# Patient Record
Sex: FEMALE | Race: WHITE | NOT HISPANIC OR LATINO | Employment: FULL TIME | URBAN - METROPOLITAN AREA
[De-identification: names, ages, dates, MRNs, and addresses within clinical notes are randomized per-mention and may not be internally consistent; named-entity substitution may affect disease eponyms.]

---

## 2019-05-08 ENCOUNTER — OFFICE VISIT (OUTPATIENT)
Dept: SURGERY | Facility: CLINIC | Age: 39
End: 2019-05-08
Payer: COMMERCIAL

## 2019-05-08 VITALS
WEIGHT: 184 LBS | DIASTOLIC BLOOD PRESSURE: 85 MMHG | HEIGHT: 64 IN | SYSTOLIC BLOOD PRESSURE: 150 MMHG | BODY MASS INDEX: 31.41 KG/M2

## 2019-05-08 DIAGNOSIS — A63.0 ANAL WARTS: Primary | ICD-10-CM

## 2019-05-08 PROCEDURE — 46600 DIAGNOSTIC ANOSCOPY SPX: CPT | Performed by: SURGERY

## 2019-05-08 PROCEDURE — 99244 OFF/OP CNSLTJ NEW/EST MOD 40: CPT | Mod: 25 | Performed by: SURGERY

## 2019-05-08 RX ORDER — ALPRAZOLAM 0.5 MG/1
0.25 TABLET ORAL NIGHTLY PRN
COMMUNITY
End: 2024-07-11 | Stop reason: HOSPADM

## 2019-05-08 RX ORDER — SERTRALINE HYDROCHLORIDE 50 MG/1
5 TABLET, FILM COATED ORAL
Refills: 4 | COMMUNITY
Start: 2019-02-17 | End: 2023-10-11

## 2019-05-08 NOTE — LETTER
May 8, 2019     Nestor Spencer MD  [address]  [facility]  [place] [facility] [place] [address]    Patient: Luiza Kaminski   YOB: 1980   Date of Visit: 2019       Dear Dr. Spencer:    Thank you for referring Luiza Kaminski to me for evaluation. Below are my notes for this consultation.    If you have questions, please do not hesitate to call me. I look forward to following your patient along with you.         Sincerely,        Ildefonso Clancy MD        CC: No Recipients  Ildefonso Clancy MD  2019  4:12 PM  Sign at close encounter      Chief Complaint:   Chief Complaint   Patient presents with   • Consult   .    HPI     Patient is a 38 y.o. female who presents with the followiny ago got HPV from  - had a procedure to remove the little tags - then she was followed afterwards - they must hav ebeen warts    Moved to [facility] a few years ago - has not been checked in a few years -     Pt has no bleed / no pain / does not notice any tags now - .     Medical History:   Past Medical History:   Diagnosis Date   • Anxiety    • Condyloma    • HPV (human papilloma virus) infection    • Hypothyroidism        Surgical History:   Past Surgical History:   Procedure Laterality Date   •  SECTION      two   • ORTHOPEDIC SURGERY     • SKIN TAG REMOVAL      Rectal area       Social History:   Social History     Social History   • Marital status:      Spouse name: N/A   • Number of children: N/A   • Years of education: N/A     Occupational History   • Not on file.     Social History Main Topics   • Smoking status: Never Smoker   • Smokeless tobacco: Never Used   • Alcohol use Yes   • Drug use: No   • Sexual activity: Defer     Other Topics Concern   • Not on file     Social History Narrative   • No narrative on file       Family History:   Family History   Problem Relation Age of Onset   • Hypertension Mother    • Hypertension Father        Allergies:   Penicillins    Current Medications:       Current Outpatient Prescriptions:   •  ALPRAZolam (XANAX) 0.5 mg tablet, Take 0.5 mg by mouth nightly as needed for anxiety., Disp: , Rfl:   •  LEVOTHYROXINE SODIUM (SYNTHROID ORAL), Take by mouth., Disp: , Rfl:   •  sertraline (ZOLOFT) 50 mg tablet, Take 5 mg by mouth once daily., Disp: , Rfl: 4    Review of Systems  All 14 systems reviewed and the findings are not pertinent to the current problem.    Objective     Vital Signs  Vitals:    05/08/19 1538   BP: (!) 150/85     Body mass index is 31.58 kg/m².      Physicial Exam  General Appearance:  Well developed.  Well nourished.  In no acute distress. Patient was not obese.  Head:  Posture of the head was normal.  Neck:  External features of the neck was normal.  Trachea:  Showed no abnormalities.  Trachea midline.  Extraocular Movements:  Normal.  Pupils:  Reactive to light.   Not deformed.  Oral Cavity:  Mixed dentition was not observed.  Tongue was midline.  Peripheral edema:  Not present.  Genitalia:  Normal.    Anorectal Examination:    No pilonidal sinus was observed.   No pilonidal cyst was observed.   Perianal skin was not erythematous.  No perianal wart was observed.  No anal fissure was observed.   Anus did not have a fistula.   Anal sphincter tone was normal.   No hemorrhoids were seen.   No external anal skin tags were observed.   Anus had no mass.  Rectum:  No rectal abscess was observed.   Rectal prolapse was not observed.   No rectal fistula was observed.   Rectal exam was not tender.   No rectal fluctuance was observed.  Rectal exam showed no mass.   Normal chan-anal skin with no lesions or dermatitis    No warts seen at all    Other Exam:  Musculoskeletal System:  No gross defecits or defects of the upper or lower extremities.  No cyanosis of the fingers.  Lumbar / Lumbosacral Spine:  Lumbar/lumbosacral spine exhibited no abnormalities.  Bilateral thighs:   Posterior aspect was not swollen.   Posterior aspect showed no induration.   Posterior  aspect was not erythematous.   Posterior aspect was not warm.   No mass on the posterior aspect.  Functional Exam:   General/bilateral:  Mobility was not limited.  Neurological:   No confusion was observed.   No disorientation was observed.  Speech:  Normal.  Motor:  No tremor was seen.  Balance:  Normal.  Gait And Stance:  Normal.  Psychiatric:  Mood:  Euthymic.  Affect:  Normal.  Skin:  No clubbing of the fingernails.  Normal upper and lower extremity skin exam.      Problem List Items Addressed This Visit     Anal warts - Primary     She had anal warts treated 9 years ago and was followed for a few years.  She no longer feels any warts but wants to have a checkup.  On exam today there are no internal or external warts.  My plan is for her to simply return if she feels anything abnormal in the future.                   Ildefonso Clancy MD 5/8/2019 4:12 PM           Ildefonso Clancy MD  5/8/2019  4:12 PM  Unsigned  Procedures  The patient required an examination of the anal canal.  Verbal consent was obtained for anoscopy.  First a digital rectal examination was performed.  Then a slotted anoscope was inserted into the anal canal and the canal and distal rectum were examined.  The patient tolerated it well.    Findings:  No warts seen

## 2019-05-08 NOTE — PROGRESS NOTES
Chief Complaint:   Chief Complaint   Patient presents with   • Consult   .    HPI     Patient is a 38 y.o. female who presents with the followiny ago got HPV from  - had a procedure to remove the little tags - then she was followed afterwards - they must hav ebseema warts    Moved to [facility] a few years ago - has not been checked in a few years -     Pt has no bleed / no pain / does not notice any tags now - .     Medical History:   Past Medical History:   Diagnosis Date   • Anxiety    • Condyloma    • HPV (human papilloma virus) infection    • Hypothyroidism        Surgical History:   Past Surgical History:   Procedure Laterality Date   •  SECTION      two   • ORTHOPEDIC SURGERY     • SKIN TAG REMOVAL      Rectal area       Social History:   Social History     Social History   • Marital status:      Spouse name: N/A   • Number of children: N/A   • Years of education: N/A     Occupational History   • Not on file.     Social History Main Topics   • Smoking status: Never Smoker   • Smokeless tobacco: Never Used   • Alcohol use Yes   • Drug use: No   • Sexual activity: Defer     Other Topics Concern   • Not on file     Social History Narrative   • No narrative on file       Family History:   Family History   Problem Relation Age of Onset   • Hypertension Mother    • Hypertension Father        Allergies:   Penicillins    Current Medications:      Current Outpatient Prescriptions:   •  ALPRAZolam (XANAX) 0.5 mg tablet, Take 0.5 mg by mouth nightly as needed for anxiety., Disp: , Rfl:   •  LEVOTHYROXINE SODIUM (SYNTHROID ORAL), Take by mouth., Disp: , Rfl:   •  sertraline (ZOLOFT) 50 mg tablet, Take 5 mg by mouth once daily., Disp: , Rfl: 4    Review of Systems  All 14 systems reviewed and the findings are not pertinent to the current problem.    Objective     Vital Signs  Vitals:    19 1538   BP: (!) 150/85     Body mass index is 31.58 kg/m².      Physicial Exam  General  Appearance:  Well developed.  Well nourished.  In no acute distress. Patient was not obese.  Head:  Posture of the head was normal.  Neck:  External features of the neck was normal.  Trachea:  Showed no abnormalities.  Trachea midline.  Extraocular Movements:  Normal.  Pupils:  Reactive to light.   Not deformed.  Oral Cavity:  Mixed dentition was not observed.  Tongue was midline.  Peripheral edema:  Not present.  Genitalia:  Normal.    Anorectal Examination:    No pilonidal sinus was observed.   No pilonidal cyst was observed.   Perianal skin was not erythematous.  No perianal wart was observed.  No anal fissure was observed.   Anus did not have a fistula.   Anal sphincter tone was normal.   No hemorrhoids were seen.   No external anal skin tags were observed.   Anus had no mass.  Rectum:  No rectal abscess was observed.   Rectal prolapse was not observed.   No rectal fistula was observed.   Rectal exam was not tender.   No rectal fluctuance was observed.  Rectal exam showed no mass.   Normal chan-anal skin with no lesions or dermatitis    No warts seen at all    Other Exam:  Musculoskeletal System:  No gross defecits or defects of the upper or lower extremities.  No cyanosis of the fingers.  Lumbar / Lumbosacral Spine:  Lumbar/lumbosacral spine exhibited no abnormalities.  Bilateral thighs:   Posterior aspect was not swollen.   Posterior aspect showed no induration.   Posterior aspect was not erythematous.   Posterior aspect was not warm.   No mass on the posterior aspect.  Functional Exam:   General/bilateral:  Mobility was not limited.  Neurological:   No confusion was observed.   No disorientation was observed.  Speech:  Normal.  Motor:  No tremor was seen.  Balance:  Normal.  Gait And Stance:  Normal.  Psychiatric:  Mood:  Euthymic.  Affect:  Normal.  Skin:  No clubbing of the fingernails.  Normal upper and lower extremity skin exam.      Problem List Items Addressed This Visit     Anal warts - Primary     She  had anal warts treated 9 years ago and was followed for a few years.  She no longer feels any warts but wants to have a checkup.  On exam today there are no internal or external warts.  My plan is for her to simply return if she feels anything abnormal in the future.                   Ildefonso Clancy MD 5/8/2019 4:12 PM

## 2019-05-08 NOTE — ASSESSMENT & PLAN NOTE
She had anal warts treated 9 years ago and was followed for a few years.  She no longer feels any warts but wants to have a checkup.  On exam today there are no internal or external warts.  My plan is for her to simply return if she feels anything abnormal in the future.

## 2019-05-08 NOTE — PATIENT INSTRUCTIONS
"ANAL WARTS    What are anal warts?  Anal warts (also called \"condyloma acuminata\") are a condition that affects the area around and inside the anus. They may also affect the skin of the genital area. They first appear as tiny spots or growths, perhaps as small as the head of a pin, and may grow larger than the size of a pea. Usually, they do not cause pain or discomfort to afflicted individuals. As a result, patients may be unaware that the warts are present. Some patients will experience symptoms such as itching, bleeding, mucus discharge and/or a feeling of a lump or mass in the anal area.    What causes these warts?  They are thought to be caused by the human papilloma virus (HPV) which is transmitted from person to person by direct contact. HPV is considered a sexually transmitted disease. You do not have to have anal intercourse to develop anal condyloma.    Do these warts always need to be removed?  Yes. If they are not removed, the warts usually grow larger and multiply. If left untreated, the warts may lead to an increased risk of cancer in the affected area.    What treatments are available?  If warts are very small and are located only on the skin around the anus, they may be treated with a topical medication. They may also be treated by a physician by freezing the warts with liquid nitrogen. Warts may also be removed surgically. Surgery provides immediate results but must be performed using either a local anesthetic - such as novocaine - or a general or spinal anesthetic, depending on the number and exact location of warts being treated. Warts inside the anal canal usually are not suitable for treatment by medications, and in most cases need to be treated surgically.    Must I be hospitalized for surgical treatment?  No. Surgical treatment of anal warts is usually performed as outpatient surgery.    How much time will I lose from work after surgical treatment?  Most people are moderately uncomfortable for " a few days after treatment, and pain medication may be prescribed. Depending on the extent of the disease, some people return to work the next day, while others may remain out of work for several days.    Will a single treatment cure the problem?  Recurrent warts are common. The virus that causes the warts can live concealed in tissues that appear normal for several months before another wart develops. As new warts develop they usually can be treated in the physician's office. Sometimes new warts develop so rapidly that office treatment would be quite uncomfortable. In these situations, a second and usually third outpatient surgical visit may be recommended.    How long is treatment usually continued?  Follow-up visits are necessary at frequent intervals for several months after the last wart is observed to be certain that no new warts occur.    What can be done to avoid getting these warts again?  In some cases, warts may recur repeatedly after successful removal, since the virus that causes the warts often persists in a dormant state in body tissues. Discuss with your physician how often you should be evaluated for recurrent warts. Abstain from sexual contact with individuals who have anal (or genital) warts. Since many individuals may be unaware that they suffer from this condition, sexual abstinence, condom protection or limiting sexual contact to a single partner will reduce your potential exposure to the contagious virus that causes these warts. As a precaution, sexual partners ought to be checked, even if they have no symptoms.

## 2020-10-23 ENCOUNTER — TRANSCRIBE ORDERS (OUTPATIENT)
Dept: SCHEDULING | Age: 40
End: 2020-10-23

## 2020-10-23 DIAGNOSIS — M16.2 BILATERAL OSTEOARTHRITIS RESULTING FROM HIP DYSPLASIA: ICD-10-CM

## 2020-10-23 DIAGNOSIS — Z96.641 PRESENCE OF RIGHT ARTIFICIAL HIP JOINT: Primary | ICD-10-CM

## 2020-10-30 ENCOUNTER — HOSPITAL ENCOUNTER (OUTPATIENT)
Dept: RADIOLOGY | Age: 40
Discharge: HOME | End: 2020-10-30
Attending: SPECIALIST
Payer: COMMERCIAL

## 2020-10-30 DIAGNOSIS — M16.2 BILATERAL OSTEOARTHRITIS RESULTING FROM HIP DYSPLASIA: ICD-10-CM

## 2020-10-30 DIAGNOSIS — Z96.641 PRESENCE OF RIGHT ARTIFICIAL HIP JOINT: ICD-10-CM

## 2020-10-30 PROCEDURE — 73502 X-RAY EXAM HIP UNI 2-3 VIEWS: CPT | Mod: RT

## 2021-04-14 DIAGNOSIS — Z23 ENCOUNTER FOR IMMUNIZATION: ICD-10-CM

## 2022-06-08 ENCOUNTER — OFFICE VISIT (OUTPATIENT)
Dept: SURGERY | Facility: CLINIC | Age: 42
End: 2022-06-08
Payer: COMMERCIAL

## 2022-06-08 VITALS — WEIGHT: 164 LBS | HEIGHT: 64 IN | BODY MASS INDEX: 28 KG/M2

## 2022-06-08 DIAGNOSIS — A63.0 ANAL WARTS: ICD-10-CM

## 2022-06-08 DIAGNOSIS — L30.9 DERMATITIS OF PERIANAL REGION: Primary | ICD-10-CM

## 2022-06-08 PROCEDURE — 3008F BODY MASS INDEX DOCD: CPT | Performed by: SURGERY

## 2022-06-08 PROCEDURE — 99204 OFFICE O/P NEW MOD 45 MIN: CPT | Performed by: SURGERY

## 2022-06-08 RX ORDER — LIFITEGRAST 50 MG/ML
SOLUTION/ DROPS OPHTHALMIC
COMMUNITY
Start: 2022-05-08 | End: 2024-07-11 | Stop reason: HOSPADM

## 2022-06-08 NOTE — PROGRESS NOTES
Chief Complaint:   Chief Complaint   Patient presents with   • Anal Itching       HPI     Patient is a 41 y.o. female who presents with the following:  Pt has had itching back there - started a few months ago - now won't go away - used to be on and off - now persistent - worse in last three weeks - not going away    Pt doing peloton - stopped last week due to bottom -     I saw her three years ago -  She had had a hx of anal warts - three years ago all was fine    ANORECTAL SYMPTOM GRID    1 - Timing / Duration - 2-3 mo - nevrer before  2 - Bleeding - no  3 - Pain - no - a little irritated to wipe -   4 - External tissue - one thing back there -   5 - BM Frequency - daily  6 - Dermatitis - yes - not bad in day - maybe after Bm - bad at night - keeps her up a little - delays sleep - water can sting - scratch in shower  7 - Chemicals - last three weeks - prep H cream - then used tucks wipes -   8 - Moderating Factors - no  9 - Last Colonoscopy - no          Medical History:   Past Medical History:   Diagnosis Date   • Anxiety    • Condyloma    • HPV (human papilloma virus) infection    • Hypothyroidism        Surgical History:   Past Surgical History:   Procedure Laterality Date   •  SECTION      two   • ORTHOPEDIC SURGERY     • SKIN TAG REMOVAL      Rectal area       Social History:   Social History     Socioeconomic History   • Marital status:      Spouse name: Not on file   • Number of children: Not on file   • Years of education: Not on file   • Highest education level: Not on file   Occupational History   • Not on file   Tobacco Use   • Smoking status: Never Smoker   • Smokeless tobacco: Never Used   Vaping Use   • Vaping Use: Never used   Substance and Sexual Activity   • Alcohol use: Yes   • Drug use: No   • Sexual activity: Defer   Other Topics Concern   • Not on file   Social History Narrative   • Not on file     Social Determinants of Health     Financial Resource Strain: Not on file    Food Insecurity: Not on file   Transportation Needs: Not on file   Physical Activity: Not on file   Stress: Not on file   Social Connections: Not on file   Intimate Partner Violence: Not on file   Housing Stability: Not on file       Family History:   Family History   Problem Relation Age of Onset   • Hypertension Biological Mother    • Hypertension Biological Father        Allergies:   Penicillins    Current Medications:      Current Outpatient Medications:   •  ALPRAZolam (XANAX) 0.5 mg tablet, Take 0.5 mg by mouth nightly as needed for anxiety., Disp: , Rfl:   •  LEVOTHYROXINE SODIUM (SYNTHROID ORAL), Take by mouth., Disp: , Rfl:   •  XIIDRA 5 % dropperette dropperette, INSTILL 1 DROP INTO BOTH EYES TWICE A DAY, Disp: , Rfl:   •  sertraline (ZOLOFT) 50 mg tablet, Take 5 mg by mouth once daily., Disp: , Rfl: 4    Review of Systems  All 14 systems reviewed and the findings are not pertinent to the current problem.    Objective     Vital Signs  There were no vitals filed for this visit.  Body mass index is 28.15 kg/m².      Physicial Exam  General Appearance:  Well developed.  Well nourished.  In no acute distress.    Anorectal Examination:    No pilonidal sinus was observed.   No pilonidal cyst was observed.   Perianal skin was not erythematous.  No perianal wart was observed.  No anal fissure was observed.   Anus did not have a fistula.   Anal sphincter tone was normal.   No hemorrhoids were seen.   No external anal skin tags were observed.   Anus had no mass.  Rectum:  No rectal abscess was observed.   Rectal prolapse was not observed.   No rectal fistula was observed.   Rectal exam was not tender.   No rectal fluctuance was observed.  Rectal exam showed no mass.       Skin:  Dermatitis was seen on the chan-anal skin.  Specifics:  Subtle raw skin around bottom.        Problem List Items Addressed This Visit     Anal warts     There are no warts seen on todays exam.           Dermatitis of perianal region -  Primary     The patient has irritated skin around the anus and this will be treated with avoidance of hemorrhoid products and topical Calmoseptine to allow the skin to heal.   I will see them in one month.                       Ildefonso Clancy MD 6/8/2022 7:12 PM

## 2022-06-08 NOTE — PATIENT INSTRUCTIONS
Chan-anal Dermatitis    Types  There seem to be three main types of chan-anal dermatitis that I see in my practice:  1) Contact dermatitis caused by over-use of wet wipes, preparation H and Tux wipes, and many other topical agents.  2)  Dermatitis caused by a fungal skin infection.  3)  Dermatitis of unknown origin or “pruritus ani.”  All three of these types can be improved with very simple measures.    Symptoms  Chan-anal dermatitis causes tingling, stinging, and itching of the chan-anal skin.  Some people describe a “raw” sensation and the feeling of burning when the shower or bath water touches that area.  People with chan-anal dermatitis have sensitivity and pain when cleaning up after a bowel movement but NOT specifically during the act of defecation.    Physical Exam  There is usually thickening of the chan-anal skin with tiny nicks and cuts that are only visible upon very close inspection.  This is why most physicians miss the diagnosis.  Contact dermatitis makes the skin look raw and denuded.  Fungal dermatitis looks bright red with a sharp edge around it  it from normal skin.  Pruritis ani can look like thickened, white skin with linear cracks in it.    Treatment  All of these disorders can be treated with the same two simple steps:  1) stop using wet wipes and topical hemorrhoid creams.  2)  use calmoseptine or some other inert cream that protects and soothes the skin.  Fungal infections usually resolve quickly with the application of the correct topical anti-fungal cream.  Contact dermatitis usually resolves just with avoidance of wet wipes.  Pruritis ani (in the absence of any other skin disorder like fungal infection or contact dermatitis) can be treated with steroids right off the bat.    Pitfalls  As you can see, NONE of these disorders will respond at all to Preparation H and similar hemorrhoid creams.  These agents can make the irritation worse.  Steroids are also commonly prescribed when  patients complain of chan-anal itching, and this is a mistake.  Steroids will actually make contact dermatitis and fungal infections MUCH WORSE.

## 2022-06-08 NOTE — LETTER
2022     RAMÓN Wesley C  [address] [place] [address]    Patient: Luiza Kaminski  YOB: 1980  Date of Visit: 2022      Dear Dr. Casas:    Thank you for referring Luiza Kaminski to me for evaluation. Below are my notes for this consultation.    If you have questions, please do not hesitate to call me. I look forward to following your patient along with you.         Sincerely,        Ildefonso Clancy MD        CC: No Recipients  Ildefonso Clancy MD  2022  7:12 PM  Sign when Signing Visit      Chief Complaint:   Chief Complaint   Patient presents with   • Anal Itching       HPI     Patient is a 41 y.o. female who presents with the following:  Pt has had itching back there - started a few months ago - now won't go away - used to be on and off - now persistent - worse in last three weeks - not going away    Pt doing peloton - stopped last week due to bottom -     I saw her three years ago -  She had had a hx of anal warts - three years ago all was fine    ANORECTAL SYMPTOM GRID    1 - Timing / Duration - 2-3 mo - nevrer before  2 - Bleeding - no  3 - Pain - no - a little irritated to wipe -   4 - External tissue - one thing back there -   5 - BM Frequency - daily  6 - Dermatitis - yes - not bad in day - maybe after Bm - bad at night - keeps her up a little - delays sleep - water can sting - scratch in shower  7 - Chemicals - last three weeks - prep H cream - then used tucks wipes -   8 - Moderating Factors - no  9 - Last Colonoscopy - no          Medical History:   Past Medical History:   Diagnosis Date   • Anxiety    • Condyloma    • HPV (human papilloma virus) infection    • Hypothyroidism        Surgical History:   Past Surgical History:   Procedure Laterality Date   •  SECTION      two   • ORTHOPEDIC SURGERY     • SKIN TAG REMOVAL      Rectal area       Social History:   Social History     Socioeconomic History   • Marital status:      Spouse name: Not on  file   • Number of children: Not on file   • Years of education: Not on file   • Highest education level: Not on file   Occupational History   • Not on file   Tobacco Use   • Smoking status: Never Smoker   • Smokeless tobacco: Never Used   Vaping Use   • Vaping Use: Never used   Substance and Sexual Activity   • Alcohol use: Yes   • Drug use: No   • Sexual activity: Defer   Other Topics Concern   • Not on file   Social History Narrative   • Not on file     Social Determinants of Health     Financial Resource Strain: Not on file   Food Insecurity: Not on file   Transportation Needs: Not on file   Physical Activity: Not on file   Stress: Not on file   Social Connections: Not on file   Intimate Partner Violence: Not on file   Housing Stability: Not on file       Family History:   Family History   Problem Relation Age of Onset   • Hypertension Biological Mother    • Hypertension Biological Father        Allergies:   Penicillins    Current Medications:      Current Outpatient Medications:   •  ALPRAZolam (XANAX) 0.5 mg tablet, Take 0.5 mg by mouth nightly as needed for anxiety., Disp: , Rfl:   •  LEVOTHYROXINE SODIUM (SYNTHROID ORAL), Take by mouth., Disp: , Rfl:   •  XIIDRA 5 % dropperette dropperette, INSTILL 1 DROP INTO BOTH EYES TWICE A DAY, Disp: , Rfl:   •  sertraline (ZOLOFT) 50 mg tablet, Take 5 mg by mouth once daily., Disp: , Rfl: 4    Review of Systems  All 14 systems reviewed and the findings are not pertinent to the current problem.    Objective     Vital Signs  There were no vitals filed for this visit.  Body mass index is 28.15 kg/m².      Physicial Exam  General Appearance:  Well developed.  Well nourished.  In no acute distress.    Anorectal Examination:    No pilonidal sinus was observed.   No pilonidal cyst was observed.   Perianal skin was not erythematous.  No perianal wart was observed.  No anal fissure was observed.   Anus did not have a fistula.   Anal sphincter tone was normal.   No hemorrhoids  were seen.   No external anal skin tags were observed.   Anus had no mass.  Rectum:  No rectal abscess was observed.   Rectal prolapse was not observed.   No rectal fistula was observed.   Rectal exam was not tender.   No rectal fluctuance was observed.  Rectal exam showed no mass.       Skin:  Dermatitis was seen on the chan-anal skin.  Specifics:  Subtle raw skin around bottom.        Problem List Items Addressed This Visit     Anal warts     There are no warts seen on todays exam.           Dermatitis of perianal region - Primary     The patient has irritated skin around the anus and this will be treated with avoidance of hemorrhoid products and topical Calmoseptine to allow the skin to heal.   I will see them in one month.                       Ildefonso Clancy MD 6/8/2022 7:12 PM

## 2022-07-10 NOTE — PROGRESS NOTES
Chief Complaint:   Chief Complaint   Patient presents with   • Follow-up       HPI     Patient is a 41 y.o. female who presents with the following:      Anal Warts:  19 - normal exam    Dermatitis of perianal region:  22 - sx - itching 3 mo - PE mild raw skin around botom - plan stop wipes and use neel    A little better but still itchy and irritated ayanna at night  (+) itching is the same; worsens at night  (-) no stinging or burning  (-) stopped the creams/wipes  (-) no vaginal itching    Using the neel 2x per day    Medical History:   Past Medical History:   Diagnosis Date   • Anxiety    • Condyloma    • HPV (human papilloma virus) infection    • Hypothyroidism        Surgical History:   Past Surgical History:   Procedure Laterality Date   •  SECTION      two   • ORTHOPEDIC SURGERY     • SKIN TAG REMOVAL      Rectal area       Social History:   Social History     Socioeconomic History   • Marital status:      Spouse name: Not on file   • Number of children: Not on file   • Years of education: Not on file   • Highest education level: Not on file   Occupational History   • Not on file   Tobacco Use   • Smoking status: Never Smoker   • Smokeless tobacco: Never Used   Vaping Use   • Vaping Use: Never used   Substance and Sexual Activity   • Alcohol use: Yes   • Drug use: No   • Sexual activity: Defer   Other Topics Concern   • Not on file   Social History Narrative   • Not on file     Social Determinants of Health     Financial Resource Strain: Not on file   Food Insecurity: Not on file   Transportation Needs: Not on file   Physical Activity: Not on file   Stress: Not on file   Social Connections: Not on file   Intimate Partner Violence: Not on file   Housing Stability: Not on file       Family History:   Family History   Problem Relation Age of Onset   • Hypertension Biological Mother    • Hypertension Biological Father        Allergies:   Penicillins    Current Medications:       Current Outpatient Medications:   •  ALPRAZolam (XANAX) 0.5 mg tablet, Take 0.5 mg by mouth nightly as needed for anxiety., Disp: , Rfl:   •  betamethasone, augmented, (DIPROLENE, AUGMENTED,) 0.05 % ointment, Apply topically 2 (two) times a day., Disp: 30 g, Rfl: 0  •  LEVOTHYROXINE SODIUM (SYNTHROID ORAL), Take by mouth., Disp: , Rfl:   •  sertraline (ZOLOFT) 50 mg tablet, Take 5 mg by mouth once daily., Disp: , Rfl: 4  •  XIIDRA 5 % dropperette dropperette, INSTILL 1 DROP INTO BOTH EYES TWICE A DAY, Disp: , Rfl:     Review of Systems  All 14 systems reviewed and the findings are not pertinent to the current problem.    Objective     Vital Signs  There were no vitals filed for this visit.  Body mass index is 27.98 kg/m².      Physicial Exam  General Appearance:  Well developed.  Well nourished.  In no acute distress.    Anorectal Examination:    No pilonidal sinus was observed.   No pilonidal cyst was observed.   Perianal skin was not erythematous.  No perianal wart was observed.  No anal fissure was observed.   Anus did not have a fistula.   Anal sphincter tone was normal.   No hemorrhoids were seen.   No external anal skin tags were observed.   Anus had no mass.  Rectum:  No rectal abscess was observed.   Rectal prolapse was not observed.   No rectal fistula was observed.   Rectal exam was not tender.   No rectal fluctuance was observed.  Rectal exam showed no mass.       Skin:  Dermatitis was seen on the chan-anal skin.  Specifics:  Chan-anal whitish, shiny epithelium.    Problem List Items Addressed This Visit        Infectious/Inflammatory    Dermatitis of perianal region - Primary     Symptoms have not resolved after 1 month of Calmoseptine. Her physical exam and continued symptoms are consistent with a primary dermatitis. This will be treated a topical steroid, Diprolene. I will see the patient back in one month.           Relevant Medications    betamethasone, augmented, (DIPROLENE, AUGMENTED,) 0.05 %  RAMÓN Howell 7/20/2022 11:06 AM

## 2022-07-20 ENCOUNTER — OFFICE VISIT (OUTPATIENT)
Dept: SURGERY | Facility: CLINIC | Age: 42
End: 2022-07-20
Payer: COMMERCIAL

## 2022-07-20 VITALS — BODY MASS INDEX: 27.83 KG/M2 | WEIGHT: 163 LBS | HEIGHT: 64 IN

## 2022-07-20 DIAGNOSIS — L30.9 DERMATITIS OF PERIANAL REGION: Primary | ICD-10-CM

## 2022-07-20 PROCEDURE — 3008F BODY MASS INDEX DOCD: CPT | Performed by: SURGERY

## 2022-07-20 PROCEDURE — 99213 OFFICE O/P EST LOW 20 MIN: CPT | Performed by: SURGERY

## 2022-07-20 RX ORDER — BETAMETHASONE DIPROPIONATE 0.5 MG/G
OINTMENT, AUGMENTED TOPICAL 2 TIMES DAILY
Qty: 30 G | Refills: 0 | Status: SHIPPED | OUTPATIENT
Start: 2022-07-20 | End: 2024-07-11 | Stop reason: HOSPADM

## 2022-07-20 NOTE — LETTER
2022     RAMÓN Wesley C  [address] [facility]  [place] [address]    Patient: Luiza Kaminski  YOB: 1980  Date of Visit: 2022      Dear Dr. Casas:    Thank you for referring Luiza Kaminski to me for evaluation. Below are my notes for this consultation.    If you have questions, please do not hesitate to call me. I look forward to following your patient along with you.         Sincerely,        Ildefonso Clancy MD        CC: No Recipients  Angelita Aldana PA C  2022 11:06 AM  Sign when Signing Visit      Chief Complaint:   Chief Complaint   Patient presents with   • Follow-up       HPI     Patient is a 41 y.o. female who presents with the following:      Anal Warts:  19 - normal exam    Dermatitis of perianal region:  22 - sx - itching 3 mo - PE mild raw skin around botom - plan stop wipes and use neel    A little better but still itchy and irritated ayanna at night  (+) itching is the same; worsens at night  (-) no stinging or burning  (-) stopped the creams/wipes  (-) no vaginal itching    Using the neel 2x per day    Medical History:   Past Medical History:   Diagnosis Date   • Anxiety    • Condyloma    • HPV (human papilloma virus) infection    • Hypothyroidism        Surgical History:   Past Surgical History:   Procedure Laterality Date   •  SECTION      two   • ORTHOPEDIC SURGERY     • SKIN TAG REMOVAL      Rectal area       Social History:   Social History     Socioeconomic History   • Marital status:      Spouse name: Not on file   • Number of children: Not on file   • Years of education: Not on file   • Highest education level: Not on file   Occupational History   • Not on file   Tobacco Use   • Smoking status: Never Smoker   • Smokeless tobacco: Never Used   Vaping Use   • Vaping Use: Never used   Substance and Sexual Activity   • Alcohol use: Yes   • Drug use: No   • Sexual activity: Defer   Other Topics Concern   • Not on file   Social  History Narrative   • Not on file     Social Determinants of Health     Financial Resource Strain: Not on file   Food Insecurity: Not on file   Transportation Needs: Not on file   Physical Activity: Not on file   Stress: Not on file   Social Connections: Not on file   Intimate Partner Violence: Not on file   Housing Stability: Not on file       Family History:   Family History   Problem Relation Age of Onset   • Hypertension Biological Mother    • Hypertension Biological Father        Allergies:   Penicillins    Current Medications:      Current Outpatient Medications:   •  ALPRAZolam (XANAX) 0.5 mg tablet, Take 0.5 mg by mouth nightly as needed for anxiety., Disp: , Rfl:   •  betamethasone, augmented, (DIPROLENE, AUGMENTED,) 0.05 % ointment, Apply topically 2 (two) times a day., Disp: 30 g, Rfl: 0  •  LEVOTHYROXINE SODIUM (SYNTHROID ORAL), Take by mouth., Disp: , Rfl:   •  sertraline (ZOLOFT) 50 mg tablet, Take 5 mg by mouth once daily., Disp: , Rfl: 4  •  XIIDRA 5 % dropperette dropperette, INSTILL 1 DROP INTO BOTH EYES TWICE A DAY, Disp: , Rfl:     Review of Systems  All 14 systems reviewed and the findings are not pertinent to the current problem.    Objective     Vital Signs  There were no vitals filed for this visit.  Body mass index is 27.98 kg/m².      Physicial Exam  General Appearance:  Well developed.  Well nourished.  In no acute distress.    Anorectal Examination:    No pilonidal sinus was observed.   No pilonidal cyst was observed.   Perianal skin was not erythematous.  No perianal wart was observed.  No anal fissure was observed.   Anus did not have a fistula.   Anal sphincter tone was normal.   No hemorrhoids were seen.   No external anal skin tags were observed.   Anus had no mass.  Rectum:  No rectal abscess was observed.   Rectal prolapse was not observed.   No rectal fistula was observed.   Rectal exam was not tender.   No rectal fluctuance was observed.  Rectal exam showed no mass.       Skin:   Dermatitis was seen on the chan-anal skin.  Specifics:  Chan-anal whitish, shiny epithelium.    Problem List Items Addressed This Visit        Infectious/Inflammatory    Dermatitis of perianal region - Primary     Symptoms have not resolved after 1 month of Calmoseptine. Her physical exam and continued symptoms are consistent with a primary dermatitis. This will be treated a topical steroid, Diprolene. I will see the patient back in one month.           Relevant Medications    betamethasone, augmented, (DIPROLENE, AUGMENTED,) 0.05 % ointment              RAMÓN Ashley 7/20/2022 11:06 AM

## 2022-07-20 NOTE — ASSESSMENT & PLAN NOTE
Symptoms have not resolved after 1 month of Calmoseptine. Her physical exam and continued symptoms are consistent with a primary dermatitis. This will be treated a topical steroid, Diprolene. I will see the patient back in one month.

## 2022-07-21 ENCOUNTER — TELEPHONE (OUTPATIENT)
Dept: SURGERY | Facility: CLINIC | Age: 42
End: 2022-07-21
Payer: COMMERCIAL

## 2022-07-21 NOTE — TELEPHONE ENCOUNTER
Pt went to pharmacy and her cream wasn't called in ,also she wants to know the name of the cream she will be using

## 2022-09-18 NOTE — PROGRESS NOTES
Chief Complaint:   Chief Complaint   Patient presents with    Follow-up       HPI     Patient is a 42 y.o. female who presents with the following:      Anal warts:  19 - normal exam    Dermatitis of perianal region:  22 - sx - itching 3 mo - PE mild raw skin around botom - plat stop wipes and use neel  22 - sx not improved, still itching - PE mild raw skin - failed neel - plan steroids    All the itching has stopped -     Had postiive urine culture by PCP - RX with ABX - cipro -      Used cream for 3 days - stopped cream due to zero itching        Medical History:   Past Medical History:   Diagnosis Date    Anxiety     Condyloma     HPV (human papilloma virus) infection     Hypothyroidism        Surgical History:   Past Surgical History:   Procedure Laterality Date     SECTION      two    ORTHOPEDIC SURGERY      SKIN TAG REMOVAL      Rectal area       Social History:   Social History     Socioeconomic History    Marital status:      Spouse name: Not on file    Number of children: Not on file    Years of education: Not on file    Highest education level: Not on file   Occupational History    Not on file   Tobacco Use    Smoking status: Never Smoker    Smokeless tobacco: Never Used   Vaping Use    Vaping Use: Never used   Substance and Sexual Activity    Alcohol use: Yes    Drug use: No    Sexual activity: Defer   Other Topics Concern    Not on file   Social History Narrative    Not on file     Social Determinants of Health     Financial Resource Strain: Not on file   Food Insecurity: Not on file   Transportation Needs: Not on file   Physical Activity: Not on file   Stress: Not on file   Social Connections: Not on file   Intimate Partner Violence: Not on file   Housing Stability: Not on file       Family History:   Family History   Problem Relation Age of Onset    Hypertension Biological Mother     Hypertension Biological Father        Allergies:    Penicillins    Current Medications:      Current Outpatient Medications:     ALPRAZolam (XANAX) 0.5 mg tablet, Take 0.25 mg by mouth nightly as needed for anxiety., Disp: , Rfl:     betamethasone, augmented, (DIPROLENE, AUGMENTED,) 0.05 % ointment, Apply topically 2 (two) times a day., Disp: 30 g, Rfl: 0    LEVOTHYROXINE SODIUM (SYNTHROID ORAL), Take 75 mcg by mouth., Disp: , Rfl:     sertraline (ZOLOFT) 50 mg tablet, Take 5 mg by mouth once daily., Disp: , Rfl: 4    XIIDRA 5 % dropperette dropperette, INSTILL 1 DROP INTO BOTH EYES TWICE A DAY, Disp: , Rfl:     Review of Systems  All 14 systems reviewed and the findings are not pertinent to the current problem.    Objective     Vital Signs  There were no vitals filed for this visit.  Body mass index is 27.98 kg/m².      Physicial Exam  General Appearance:  Well developed.  Well nourished.  In no acute distress.    Anorectal Examination:    No pilonidal sinus was observed.   No pilonidal cyst was observed.   Perianal skin was not erythematous.  No perianal wart was observed.  No anal fissure was observed.   Anus did not have a fistula.   Anal sphincter tone was normal.   No hemorrhoids were seen.   No external anal skin tags were observed.   Anus had no mass.  Rectum:  No rectal abscess was observed.   Rectal prolapse was not observed.   No rectal fistula was observed.   Rectal exam was not tender.   No rectal fluctuance was observed.  Rectal exam showed no mass.   Normal chan-anal skin with no lesions or dermatitis    No dermatitis          Problem List Items Addressed This Visit     Dermatitis of perianal region - Primary     Her anal itching has stopped after three days of steroids along with a few days of cipro.  On exam her skin appears normal and she is very happy with her progress.  She will follow up as needed.                      Ildefonso Clancy MD 9/22/2022 4:56 PM

## 2022-09-22 ENCOUNTER — OFFICE VISIT (OUTPATIENT)
Dept: SURGERY | Facility: CLINIC | Age: 42
End: 2022-09-22
Payer: COMMERCIAL

## 2022-09-22 VITALS — WEIGHT: 163 LBS | HEIGHT: 64 IN | BODY MASS INDEX: 27.83 KG/M2

## 2022-09-22 DIAGNOSIS — L30.9 DERMATITIS OF PERIANAL REGION: Primary | ICD-10-CM

## 2022-09-22 PROCEDURE — 99213 OFFICE O/P EST LOW 20 MIN: CPT | Performed by: SURGERY

## 2022-09-22 PROCEDURE — 3008F BODY MASS INDEX DOCD: CPT | Performed by: SURGERY

## 2022-09-22 NOTE — LETTER
2022     RAMÓN Wesley C  [address] [place] [address]    Patient: Luiza Kaminski  YOB: 1980  Date of Visit: 2022      Dear Dr. Casas:    Thank you for referring Luiza Kaminski to me for evaluation. Below are my notes for this consultation.    If you have questions, please do not hesitate to call me. I look forward to following your patient along with you.         Sincerely,        Ildefonso Clancy MD        CC: No Recipients  Ildefonso Clancy MD  2022  4:57 PM  Sign when Signing Visit      Chief Complaint:   Chief Complaint   Patient presents with    Follow-up       HPI     Patient is a 42 y.o. female who presents with the following:      Anal warts:  19 - normal exam    Dermatitis of perianal region:  22 - sx - itching 3 mo - PE mild raw skin around botom - plat stop wipes and use neel  22 - sx not improved, still itching - PE mild raw skin - failed neel - plan steroids    All the itching has stopped -     Had postiive urine culture by PCP - RX with ABX - cipro -      Used cream for 3 days - stopped cream due to zero itching        Medical History:   Past Medical History:   Diagnosis Date    Anxiety     Condyloma     HPV (human papilloma virus) infection     Hypothyroidism        Surgical History:   Past Surgical History:   Procedure Laterality Date     SECTION      two    ORTHOPEDIC SURGERY      SKIN TAG REMOVAL      Rectal area       Social History:   Social History     Socioeconomic History    Marital status:      Spouse name: Not on file    Number of children: Not on file    Years of education: Not on file    Highest education level: Not on file   Occupational History    Not on file   Tobacco Use    Smoking status: Never Smoker    Smokeless tobacco: Never Used   Vaping Use    Vaping Use: Never used   Substance and Sexual Activity    Alcohol use: Yes    Drug use: No    Sexual activity: Defer   Other  Topics Concern    Not on file   Social History Narrative    Not on file     Social Determinants of Health     Financial Resource Strain: Not on file   Food Insecurity: Not on file   Transportation Needs: Not on file   Physical Activity: Not on file   Stress: Not on file   Social Connections: Not on file   Intimate Partner Violence: Not on file   Housing Stability: Not on file       Family History:   Family History   Problem Relation Age of Onset    Hypertension Biological Mother     Hypertension Biological Father        Allergies:   Penicillins    Current Medications:      Current Outpatient Medications:     ALPRAZolam (XANAX) 0.5 mg tablet, Take 0.25 mg by mouth nightly as needed for anxiety., Disp: , Rfl:     betamethasone, augmented, (DIPROLENE, AUGMENTED,) 0.05 % ointment, Apply topically 2 (two) times a day., Disp: 30 g, Rfl: 0    LEVOTHYROXINE SODIUM (SYNTHROID ORAL), Take 75 mcg by mouth., Disp: , Rfl:     sertraline (ZOLOFT) 50 mg tablet, Take 5 mg by mouth once daily., Disp: , Rfl: 4    XIIDRA 5 % dropperette dropperette, INSTILL 1 DROP INTO BOTH EYES TWICE A DAY, Disp: , Rfl:     Review of Systems  All 14 systems reviewed and the findings are not pertinent to the current problem.    Objective     Vital Signs  There were no vitals filed for this visit.  Body mass index is 27.98 kg/m².      Physicial Exam  General Appearance:  Well developed.  Well nourished.  In no acute distress.    Anorectal Examination:    No pilonidal sinus was observed.   No pilonidal cyst was observed.   Perianal skin was not erythematous.  No perianal wart was observed.  No anal fissure was observed.   Anus did not have a fistula.   Anal sphincter tone was normal.   No hemorrhoids were seen.   No external anal skin tags were observed.   Anus had no mass.  Rectum:  No rectal abscess was observed.   Rectal prolapse was not observed.   No rectal fistula was observed.   Rectal exam was not tender.   No rectal fluctuance was  observed.  Rectal exam showed no mass.   Normal chan-anal skin with no lesions or dermatitis    No dermatitis          Problem List Items Addressed This Visit     Dermatitis of perianal region - Primary     Her anal itching has stopped after three days of steroids along with a few days of cipro.  On exam her skin appears normal and she is very happy with her progress.  She will follow up as needed.                      Ildefonso Clancy MD 9/22/2022 4:56 PM

## 2022-09-22 NOTE — ASSESSMENT & PLAN NOTE
Her anal itching has stopped after three days of steroids along with a few days of cipro.  On exam her skin appears normal and she is very happy with her progress.  She will follow up as needed.

## 2022-10-04 ENCOUNTER — TRANSCRIBE ORDERS (OUTPATIENT)
Dept: SCHEDULING | Age: 42
End: 2022-10-04

## 2022-10-04 DIAGNOSIS — E78.00 PURE HYPERCHOLESTEROLEMIA, UNSPECIFIED: Primary | ICD-10-CM

## 2022-10-04 DIAGNOSIS — E03.9 HYPOTHYROIDISM, UNSPECIFIED: ICD-10-CM

## 2022-10-18 ENCOUNTER — HOSPITAL ENCOUNTER (OUTPATIENT)
Dept: RADIOLOGY | Facility: HOSPITAL | Age: 42
Discharge: HOME | End: 2022-10-18
Attending: INTERNAL MEDICINE

## 2022-10-18 DIAGNOSIS — E03.9 HYPOTHYROIDISM, UNSPECIFIED: ICD-10-CM

## 2022-10-18 DIAGNOSIS — E78.00 PURE HYPERCHOLESTEROLEMIA, UNSPECIFIED: ICD-10-CM

## 2022-10-18 PROCEDURE — 75571 CT HRT W/O DYE W/CA TEST: CPT

## 2023-08-23 ENCOUNTER — TELEPHONE (OUTPATIENT)
Dept: SURGERY | Facility: CLINIC | Age: 43
End: 2023-08-23
Payer: COMMERCIAL

## 2023-08-23 NOTE — TELEPHONE ENCOUNTER
Yousif Moeller,    This PT needs to schedule a video chat colonoscopy consult with Angelita.      Thank You

## 2023-10-11 ENCOUNTER — TELEPHONE (OUTPATIENT)
Dept: SURGERY | Facility: CLINIC | Age: 43
End: 2023-10-11

## 2023-10-11 ENCOUNTER — TELEMEDICINE (OUTPATIENT)
Dept: SURGERY | Facility: CLINIC | Age: 43
End: 2023-10-11
Payer: COMMERCIAL

## 2023-10-11 DIAGNOSIS — L30.9 DERMATITIS OF PERIANAL REGION: ICD-10-CM

## 2023-10-11 DIAGNOSIS — Z12.11 ENCOUNTER FOR SCREENING COLONOSCOPY: ICD-10-CM

## 2023-10-11 PROCEDURE — 99999 PR OFFICE/OUTPT VISIT,PROCEDURE ONLY: CPT | Mod: 95

## 2023-10-11 RX ORDER — BETAMETHASONE DIPROPIONATE 0.5 MG/G
OINTMENT, AUGMENTED TOPICAL 2 TIMES DAILY
Qty: 30 G | Refills: 0 | Status: SHIPPED | OUTPATIENT
Start: 2023-10-11 | End: 2024-07-11 | Stop reason: HOSPADM

## 2023-10-11 RX ORDER — ALPRAZOLAM 0.25 MG/1
TABLET ORAL
COMMUNITY
Start: 2023-08-08 | End: 2024-07-11 | Stop reason: HOSPADM

## 2023-10-11 RX ORDER — LEVOTHYROXINE SODIUM 75 UG/1
TABLET ORAL
COMMUNITY
Start: 2023-10-08 | End: 2024-07-11 | Stop reason: HOSPADM

## 2023-10-11 NOTE — ASSESSMENT & PLAN NOTE
This patient has intermittent perianal itching. We will treat this with a topical steroid. She will do this for one month and follow up.

## 2023-10-11 NOTE — PROGRESS NOTES
THIS IS A TELEMEDICINE VISIT  New vs. Established -  Established  Patient in general understands insurance and financial ramifications of this telemedicine visit - Yes.  Consent was obtained for telemedicine visit - Yes.  Pt is in [facility] - Yes.  Modality  - Streemhart video  Time on the call - 15 minutes      Chief Complaint: No chief complaint on file.      HPI     Patient is a 43 y.o. female who presents with the following:      First colonoscopy    Tx in past for perianal dermatitis and anal warts    Denies change in bowel habits, Bleeding with bowel movements or rectal bleeding, hemorrhoids, narrow stools, weight loss, decreased appetite, rectal or abdominal pain, anemia.       NO pacemaker  NO personal hx of colon or rectal cancer  NO personal hx of IBD (UC, Crohn's)  NO FHx of Colon or Rectal Cancers    Medical History:   Past Medical History:   Diagnosis Date    Anxiety     Condyloma     HPV (human papilloma virus) infection     Hypothyroidism        Surgical History:   Past Surgical History:   Procedure Laterality Date     SECTION      two    ORTHOPEDIC SURGERY      SKIN TAG REMOVAL      Rectal area       Social History:   Social History     Socioeconomic History    Marital status:      Spouse name: Not on file    Number of children: Not on file    Years of education: Not on file    Highest education level: Not on file   Occupational History    Not on file   Tobacco Use    Smoking status: Never    Smokeless tobacco: Never   Vaping Use    Vaping Use: Never used   Substance and Sexual Activity    Alcohol use: Yes    Drug use: No    Sexual activity: Defer   Other Topics Concern    Not on file   Social History Narrative    Not on file     Social Determinants of Health     Financial Resource Strain: Not on file   Food Insecurity: Not on file   Transportation Needs: Not on file   Physical Activity: Not on file   Stress: Not on file   Social Connections: Not on file    Intimate Partner Violence: Not on file   Housing Stability: Not on file       Family History:   Family History   Problem Relation Age of Onset    Hypertension Biological Mother     Hypertension Biological Father        Allergies:   Penicillins    Current Medications:      Current Outpatient Medications:     betamethasone, augmented, (DIPROLENE, AUGMENTED,) 0.05 % ointment, Apply topically 2 (two) times a day., Disp: 30 g, Rfl: 0    ALPRAZolam (XANAX) 0.25 mg tablet, TAKE 1 TABLET BY MOUTH EVERY DAY AS NEEDED FOR ANXIETY 30, Disp: , Rfl:     ALPRAZolam (XANAX) 0.5 mg tablet, Take 0.25 mg by mouth nightly as needed for anxiety., Disp: , Rfl:     betamethasone, augmented, (DIPROLENE, AUGMENTED,) 0.05 % ointment, Apply topically 2 (two) times a day., Disp: 30 g, Rfl: 0    LEVOTHYROXINE SODIUM (SYNTHROID ORAL), Take 75 mcg by mouth., Disp: , Rfl:     SYNTHROID 75 mcg tablet, , Disp: , Rfl:     XIIDRA 5 % dropperette dropperette, INSTILL 1 DROP INTO BOTH EYES TWICE A DAY, Disp: , Rfl:     Review of Systems  All 14 systems reviewed and the findings are not pertinent to the current problem.    Objective     Vital Signs  There were no vitals filed for this visit.  There is no height or weight on file to calculate BMI.      Physical Exam  none    Problem List Items Addressed This Visit        Infectious/Inflammatory    Dermatitis of perianal region     This patient has intermittent perianal itching. We will treat this with a topical steroid. She will do this for one month and follow up.         Relevant Medications    betamethasone, augmented, (DIPROLENE, AUGMENTED,) 0.05 % ointment       Other    Encounter for screening colonoscopy     This patient is 43 years old and has never had a colonoscopy.  We will schedule a screening study in the near future.                    RAMÓN Ashley 10/11/2023 9:43 AM

## 2023-10-11 NOTE — ASSESSMENT & PLAN NOTE
This patient is 43 years old and has never had a colonoscopy.  We will schedule a screening study in the near future.

## 2024-05-24 PROCEDURE — 200200 PR NO CHARGE: Performed by: SURGERY

## 2024-05-24 NOTE — H&P
THIS IS A TELEMEDICINE VISIT  New vs. Established -  Established  Patient in general understands insurance and financial ramifications of this telemedicine visit - Yes.  Consent was obtained for telemedicine visit - Yes.  Pt is in [facility] - Yes.  Modality  - Cornicehart video  Time on the call - 15 minutes      Chief Complaint: No chief complaint on file.      HPI     Patient is a 43 y.o. female who presents with the following:      First colonoscopy    Tx in past for perianal dermatitis and anal warts    Denies change in bowel habits, Bleeding with bowel movements or rectal bleeding, hemorrhoids, narrow stools, weight loss, decreased appetite, rectal or abdominal pain, anemia.       NO pacemaker  NO personal hx of colon or rectal cancer  NO personal hx of IBD (UC, Crohn's)  NO FHx of Colon or Rectal Cancers    Medical History:   Past Medical History:   Diagnosis Date   • Anxiety    • Condyloma    • HPV (human papilloma virus) infection    • Hypothyroidism        Surgical History:   Past Surgical History:   Procedure Laterality Date   •  SECTION      two   • ORTHOPEDIC SURGERY     • SKIN TAG REMOVAL      Rectal area       Social History:   Social History     Socioeconomic History   • Marital status:      Spouse name: Not on file   • Number of children: Not on file   • Years of education: Not on file   • Highest education level: Not on file   Occupational History   • Not on file   Tobacco Use   • Smoking status: Never   • Smokeless tobacco: Never   Vaping Use   • Vaping Use: Never used   Substance and Sexual Activity   • Alcohol use: Yes   • Drug use: No   • Sexual activity: Defer   Other Topics Concern   • Not on file   Social History Narrative   • Not on file     Social Determinants of Health     Financial Resource Strain: Not on file   Food Insecurity: Not on file   Transportation Needs: Not on file   Physical Activity: Not on file   Stress: Not on file   Social Connections: Not on file    Intimate Partner Violence: Not on file   Housing Stability: Not on file       Family History:   Family History   Problem Relation Age of Onset   • Hypertension Biological Mother    • Hypertension Biological Father        Allergies:   Penicillins    Current Medications:      Current Outpatient Medications:   •  betamethasone, augmented, (DIPROLENE, AUGMENTED,) 0.05 % ointment, Apply topically 2 (two) times a day., Disp: 30 g, Rfl: 0  •  ALPRAZolam (XANAX) 0.25 mg tablet, TAKE 1 TABLET BY MOUTH EVERY DAY AS NEEDED FOR ANXIETY 30, Disp: , Rfl:   •  ALPRAZolam (XANAX) 0.5 mg tablet, Take 0.25 mg by mouth nightly as needed for anxiety., Disp: , Rfl:   •  betamethasone, augmented, (DIPROLENE, AUGMENTED,) 0.05 % ointment, Apply topically 2 (two) times a day., Disp: 30 g, Rfl: 0  •  LEVOTHYROXINE SODIUM (SYNTHROID ORAL), Take 75 mcg by mouth., Disp: , Rfl:   •  SYNTHROID 75 mcg tablet, , Disp: , Rfl:   •  XIIDRA 5 % dropperette dropperette, INSTILL 1 DROP INTO BOTH EYES TWICE A DAY, Disp: , Rfl:     Review of Systems  All 14 systems reviewed and the findings are not pertinent to the current problem.    Objective     Vital Signs  There were no vitals filed for this visit.  There is no height or weight on file to calculate BMI.      Physical Exam  none    Problem List Items Addressed This Visit        Infectious/Inflammatory    Dermatitis of perianal region     This patient has intermittent perianal itching. We will treat this with a topical steroid. She will do this for one month and follow up.         Relevant Medications    betamethasone, augmented, (DIPROLENE, AUGMENTED,) 0.05 % ointment       Other    Encounter for screening colonoscopy     This patient is 43 years old and has never had a colonoscopy.  We will schedule a screening study in the near future.                    RAMÓN Ashley 10/11/2023 9:43 AM

## 2024-05-28 ENCOUNTER — TELEPHONE (OUTPATIENT)
Dept: SURGERY | Facility: CLINIC | Age: 44
End: 2024-05-28
Payer: COMMERCIAL

## 2024-05-28 NOTE — TELEPHONE ENCOUNTER
Luiza is calling because she would like to speak to someone about cold symptoms that she has . She is scheduled for a colonoscopy on Thursday and wants advise on whether to reschedule or not.

## 2024-07-05 PROCEDURE — 200200 PR NO CHARGE: Performed by: SURGERY

## 2024-07-05 NOTE — H&P
THIS IS A TELEMEDICINE VISIT  New vs. Established -  Established  Patient in general understands insurance and financial ramifications of this telemedicine visit - Yes.  Consent was obtained for telemedicine visit - Yes.  Pt is in [facility] - Yes.  Modality  - Sookasahart video  Time on the call - 15 minutes      Chief Complaint: No chief complaint on file.      HPI     Patient is a 43 y.o. female who presents with the following:      First colonoscopy    Tx in past for perianal dermatitis and anal warts    Denies change in bowel habits, Bleeding with bowel movements or rectal bleeding, hemorrhoids, narrow stools, weight loss, decreased appetite, rectal or abdominal pain, anemia.       NO pacemaker  NO personal hx of colon or rectal cancer  NO personal hx of IBD (UC, Crohn's)  NO FHx of Colon or Rectal Cancers    Medical History:   Past Medical History:   Diagnosis Date    Anxiety     Condyloma     HPV (human papilloma virus) infection     Hypothyroidism        Surgical History:   Past Surgical History:   Procedure Laterality Date     SECTION      two    ORTHOPEDIC SURGERY      SKIN TAG REMOVAL      Rectal area       Social History:   Social History     Socioeconomic History    Marital status:      Spouse name: Not on file    Number of children: Not on file    Years of education: Not on file    Highest education level: Not on file   Occupational History    Not on file   Tobacco Use    Smoking status: Never    Smokeless tobacco: Never   Vaping Use    Vaping Use: Never used   Substance and Sexual Activity    Alcohol use: Yes    Drug use: No    Sexual activity: Defer   Other Topics Concern    Not on file   Social History Narrative    Not on file     Social Determinants of Health     Financial Resource Strain: Not on file   Food Insecurity: Not on file   Transportation Needs: Not on file   Physical Activity: Not on file   Stress: Not on file   Social Connections: Not on file   Intimate Partner  Violence: Not on file   Housing Stability: Not on file       Family History:   Family History   Problem Relation Age of Onset    Hypertension Biological Mother     Hypertension Biological Father        Allergies:   Penicillins    Current Medications:      Current Outpatient Medications:     betamethasone, augmented, (DIPROLENE, AUGMENTED,) 0.05 % ointment, Apply topically 2 (two) times a day., Disp: 30 g, Rfl: 0    ALPRAZolam (XANAX) 0.25 mg tablet, TAKE 1 TABLET BY MOUTH EVERY DAY AS NEEDED FOR ANXIETY 30, Disp: , Rfl:     ALPRAZolam (XANAX) 0.5 mg tablet, Take 0.25 mg by mouth nightly as needed for anxiety., Disp: , Rfl:     betamethasone, augmented, (DIPROLENE, AUGMENTED,) 0.05 % ointment, Apply topically 2 (two) times a day., Disp: 30 g, Rfl: 0    LEVOTHYROXINE SODIUM (SYNTHROID ORAL), Take 75 mcg by mouth., Disp: , Rfl:     SYNTHROID 75 mcg tablet, , Disp: , Rfl:     XIIDRA 5 % dropperette dropperette, INSTILL 1 DROP INTO BOTH EYES TWICE A DAY, Disp: , Rfl:     Review of Systems  All 14 systems reviewed and the findings are not pertinent to the current problem.    Objective     Vital Signs  There were no vitals filed for this visit.  There is no height or weight on file to calculate BMI.      Physical Exam  none    Problem List Items Addressed This Visit          Infectious/Inflammatory    Dermatitis of perianal region     This patient has intermittent perianal itching. We will treat this with a topical steroid. She will do this for one month and follow up.         Relevant Medications    betamethasone, augmented, (DIPROLENE, AUGMENTED,) 0.05 % ointment       Other    Encounter for screening colonoscopy     This patient is 43 years old and has never had a colonoscopy.  We will schedule a screening study in the near future.                    RAMÓN Ashley 10/11/2023 9:43 AM

## 2024-07-10 ENCOUNTER — ANESTHESIA EVENT (OUTPATIENT)
Dept: ENDOSCOPY | Facility: HOSPITAL | Age: 44
End: 2024-07-10
Payer: COMMERCIAL

## 2024-07-10 ENCOUNTER — TELEPHONE (OUTPATIENT)
Dept: SURGERY | Facility: CLINIC | Age: 44
End: 2024-07-10
Payer: COMMERCIAL

## 2024-07-10 NOTE — ANESTHESIA PREPROCEDURE EVALUATION
Relevant Problems   Other   (+) Anal warts       Anesthesia ROS/MED HX    Anesthesia History    Previous anesthetics  Pulmonary - neg  Neuro/Psych - neg  Cardiovascular- neg  Hematological - neg  GI/Hepatic   Bowel prep  Musculoskeletal- neg  Renal Disease- neg  Endo/Other   Hypothyroidism  Body Habitus: Overweight       Past Surgical History:   Procedure Laterality Date     SECTION      two    ORTHOPEDIC SURGERY      SKIN TAG REMOVAL      Rectal area       Physical Exam    Airway   Mallampati: II   TM distance: >3 FB   Neck ROM: full  Cardiovascular - normal   Rhythm: regular   Rate: normalPulmonary - normal   clear to auscultation  Dental - normal        Anesthesia Plan    Plan: general    Technique: total IV anesthesia      Airway: natural airway / supplemental oxygen        patient did not smoke on day of surgery   2 ASA  Anesthetic plan and risks discussed with: patient  Induction:    intravenous   Postop Plan:   Patient Disposition: phase II then home   Pain Management: IV analgesics

## 2024-07-10 NOTE — TELEPHONE ENCOUNTER
Luiza is calling because she is having a colonoscopy tomorrow and wants to know if she needs to take an antibiotic today due to the fact that she had a hip replacement approx 4yrs ago.

## 2024-07-11 ENCOUNTER — ANESTHESIA (OUTPATIENT)
Dept: ENDOSCOPY | Facility: HOSPITAL | Age: 44
End: 2024-07-11
Payer: COMMERCIAL

## 2024-07-11 ENCOUNTER — HOSPITAL ENCOUNTER (OUTPATIENT)
Facility: HOSPITAL | Age: 44
Discharge: HOME | End: 2024-07-11
Attending: SURGERY | Admitting: SURGERY
Payer: COMMERCIAL

## 2024-07-11 VITALS
HEART RATE: 61 BPM | SYSTOLIC BLOOD PRESSURE: 118 MMHG | DIASTOLIC BLOOD PRESSURE: 75 MMHG | HEIGHT: 64 IN | WEIGHT: 175 LBS | RESPIRATION RATE: 16 BRPM | TEMPERATURE: 97.3 F | BODY MASS INDEX: 29.88 KG/M2 | OXYGEN SATURATION: 99 %

## 2024-07-11 DIAGNOSIS — Z12.11 ENCOUNTER FOR SCREENING COLONOSCOPY: Primary | ICD-10-CM

## 2024-07-11 LAB
B-HCG UR QL: NEGATIVE
POCT TEST: NORMAL

## 2024-07-11 PROCEDURE — 75000014 HC COLONSCOPY DIAGNOSTIC: Performed by: SURGERY

## 2024-07-11 PROCEDURE — 0DJD8ZZ INSPECTION OF LOWER INTESTINAL TRACT, VIA NATURAL OR ARTIFICIAL OPENING ENDOSCOPIC: ICD-10-PCS | Performed by: SURGERY

## 2024-07-11 PROCEDURE — 37000001 HC ANESTHESIA GENERAL: Performed by: SURGERY

## 2024-07-11 PROCEDURE — 71000011 HC PACU PHASE 1 EA ADDL MIN: Performed by: SURGERY

## 2024-07-11 PROCEDURE — 45378 DIAGNOSTIC COLONOSCOPY: CPT | Mod: 33 | Performed by: SURGERY

## 2024-07-11 PROCEDURE — 25800000 HC PHARMACY IV SOLUTIONS: Performed by: SPECIALIST

## 2024-07-11 PROCEDURE — 71000001 HC PACU PHASE 1 INITIAL 30MIN: Performed by: SURGERY

## 2024-07-11 PROCEDURE — 63600000 HC DRUGS/DETAIL CODE: Mod: JW | Performed by: SPECIALIST

## 2024-07-11 RX ORDER — PROPOFOL 10 MG/ML
INJECTION, EMULSION INTRAVENOUS AS NEEDED
Status: DISCONTINUED | OUTPATIENT
Start: 2024-07-11 | End: 2024-07-11 | Stop reason: SURG

## 2024-07-11 RX ORDER — SODIUM CHLORIDE 9 MG/ML
INJECTION, SOLUTION INTRAVENOUS CONTINUOUS PRN
Status: DISCONTINUED | OUTPATIENT
Start: 2024-07-11 | End: 2024-07-11 | Stop reason: SURG

## 2024-07-11 RX ADMIN — SODIUM CHLORIDE: 0.9 INJECTION, SOLUTION INTRAVENOUS at 08:36

## 2024-07-11 RX ADMIN — PROPOFOL 100 MG: 10 INJECTION, EMULSION INTRAVENOUS at 08:43

## 2024-07-11 RX ADMIN — PROPOFOL 100 MCG/KG/MIN: 10 INJECTION, EMULSION INTRAVENOUS at 08:45

## 2024-07-11 RX ADMIN — PROPOFOL 50 MG: 10 INJECTION, EMULSION INTRAVENOUS at 08:53

## 2024-07-11 ASSESSMENT — PAIN SCALES - GENERAL: PAIN_LEVEL: 0

## 2024-07-11 NOTE — ANESTHESIOLOGIST PRE-PROCEDURE ATTESTATION
Pre-Procedure Patient Identification:  I am the Primary Anesthesiologist and have identified the patient on 07/11/24 at 8:27 AM.   I have confirmed the procedure(s) will be performed by the following surgeon/proceduralist Ildefonso Clancy MD.

## 2024-07-11 NOTE — DISCHARGE INSTRUCTIONS
You had a lower endoscopy today by Dr. Clancy.    Findings:  normal colon    1.  Resume previous diet or diet recommended by your doctor.  2.  Do not drive, operate machinery, make critical decisions, or do activities that require coordination or balance for 24 hours.  3.  Because air was put into your colon during the procedure, expelling large amounts of air from your rectum is normal.    4.  You may not have a bowel movement for 1-3 days because of the colonoscopy prep.  This is normal.  5.  Go directly to the ER if you notice any of the following:  - chills or fever over 101  - persistent vomiting  - severe abdominal pain, other than gas cramps  - severe chest pain  - black tarry stools  - any bleeding - exceeding one tablespoon    You are being discharged to home.    Since IV sedation was used, you are not permitted to drive or have any alcoholic beverages.  You may resume these activities tomorrow.    YOU SHOULD HAVE ANOTHER COLONOSCOPY IN 10 YEARS.    If you have any questions regarding the above instructions, please call Dr. Ildefonso Clancy at [phone].     
Breath sounds clear and equal bilaterally.

## 2024-07-11 NOTE — ANESTHESIA POSTPROCEDURE EVALUATION
Patient: Luiza Kaminski    Procedure Summary       Date: 07/11/24 Room / Location: [facility] 2 / [facility]    Anesthesia Start: 0839 Anesthesia Stop: 0901    Procedure: FLEXIBLE COLONOSCOPY PROXIMAL TO SPLENIC FLEXURE WITH COLLECTION OF SPECIMEN BY WASHING AND COLON DECOMPRESSION (Anus) Diagnosis:       Encounter for screening colonoscopy      (Encounter for screening colonoscopy [Z12.11])    Providers: Ildefonso Clancy MD Responsible Provider: Kaylee Painter MD    Anesthesia Type: general ASA Status: 2            Anesthesia Type: general  PACU Vitals  7/11/2024 0859 - 7/11/2024 0904        7/11/2024  0859             BP: 93/54    Pulse: 69    SpO2: 99 %              Anesthesia Post Evaluation    Pain score: 0  Pain management: adequate  Patient location during evaluation: PACU  Patient participation: waiting for patient participation  Level of consciousness: arousable  Cardiovascular status: acceptable  Airway Patency: adequate  Respiratory status: acceptable, nasal cannula and spontaneous ventilation  Hydration status: acceptable  Anesthetic complications: no

## 2024-07-11 NOTE — OP NOTE
_______________________________________________________________________________  Patient Name: Luiza Kaminski          Procedure Date: 7/11/2024 8:19 AM  MRN: [id number]                     Account Number: [id number]  YOB: 1980               Age: 43  Gender: Female                        Note Status: Finalized  Attending MD: HANNAH ALCANTARA MD~QUINTON,  _______________________________________________________________________________  Procedure:             Colonoscopy  Indications:           Screening for colorectal malignant neoplasm  Providers:             HANNAH ALCANTARA MD~QUINTON (Doctor), Elva Rodríguez  (Nurse)  Referring MD:          RAMÓN SMALLS  Requesting Provider:  Medicines:             Monitored Anesthesia Care  Complications:         No immediate complications.  _______________________________________________________________________________  Procedure:             After I obtained informed consent, the scope was  passed under direct vision. Throughout the procedure,  the patient's blood pressure, pulse, and oxygen  saturations were monitored continuously. The  colonoscope was introduced through the anus and  advanced to the cecum, identified by appendiceal  orifice and ileocecal valve. The colonoscopy was  performed without difficulty. The patient tolerated  the procedure well. The quality of the bowel  preparation was good.  Estimated Blood Loss:  Estimated blood loss: none.  Findings:  The perianal and digital rectal examinations were normal.  The entire examined colon appeared normal.  Impression:            - The entire examined colon is normal.  - No specimens collected.  Procedure Code(s):     --- Professional ---  35686, Colonoscopy, flexible; diagnostic, including  collection of specimen(s) by brushing or washing, when  performed (separate procedure)  Diagnosis Code(s):     --- Professional ---  Z12.11, Encounter for screening for malignant neoplasm  of colon  CPT  copyright 2021 [employer]. All rights reserved.  The codes documented in this report are preliminary and upon  review may  be revised to meet current compliance requirements.  Attending Participation:  I personally performed the entire procedure.  ____________________________  HANNAH ALCANTARA MD~QUINTON  7/11/2024 9:06:37 AM  This report has been signed electronically.  Number of Addenda: 0  Note Initiated On: 7/11/2024 8:19 AM

## 2024-07-11 NOTE — OR SURGEON
Pre-Procedure patient identification:  I am the primary operating surgeon/proceduralist and I have reviewed the applicable pathology reports and radiology studies for this procedure. I have identified the patient on 07/11/24 at 8:00 AM Ildefonso Clancy MD  Phone Number: [phone]

## (undated) DEVICE — KIT CLEANING ENDOSCOPY BEDSIDE 500ML NONPERFUME

## (undated) DEVICE — GOWN SIRUS POLYRNF BRTHSLV LG 30/CS

## (undated) DEVICE — BIOGUARD VALVES

## (undated) DEVICE — GOWN SIRUS POLYRNF BRTHSLV XL 30/CS

## (undated) DEVICE — GLOVE SZ 8 PROTEXIS PI